# Patient Record
Sex: MALE | Race: WHITE
[De-identification: names, ages, dates, MRNs, and addresses within clinical notes are randomized per-mention and may not be internally consistent; named-entity substitution may affect disease eponyms.]

---

## 2018-12-27 ENCOUNTER — HOSPITAL ENCOUNTER (INPATIENT)
Dept: HOSPITAL 92 - SURG A | Age: 73
LOS: 2 days | Discharge: HOME | DRG: 460 | End: 2018-12-29
Attending: NEUROLOGICAL SURGERY | Admitting: NEUROLOGICAL SURGERY
Payer: OTHER GOVERNMENT

## 2018-12-27 ENCOUNTER — HOSPITAL ENCOUNTER (OUTPATIENT)
Dept: HOSPITAL 92 - LABBT | Age: 73
Discharge: HOME | End: 2018-12-27
Attending: NEUROLOGICAL SURGERY
Payer: OTHER GOVERNMENT

## 2018-12-27 VITALS — BODY MASS INDEX: 22.2 KG/M2

## 2018-12-27 DIAGNOSIS — I10: ICD-10-CM

## 2018-12-27 DIAGNOSIS — M48.062: ICD-10-CM

## 2018-12-27 DIAGNOSIS — N40.0: ICD-10-CM

## 2018-12-27 DIAGNOSIS — Z01.818: Primary | ICD-10-CM

## 2018-12-27 DIAGNOSIS — E78.5: ICD-10-CM

## 2018-12-27 DIAGNOSIS — M51.36: ICD-10-CM

## 2018-12-27 DIAGNOSIS — Z88.1: ICD-10-CM

## 2018-12-27 DIAGNOSIS — Z88.5: ICD-10-CM

## 2018-12-27 DIAGNOSIS — M47.816: Primary | ICD-10-CM

## 2018-12-27 DIAGNOSIS — J44.9: ICD-10-CM

## 2018-12-27 DIAGNOSIS — I73.9: ICD-10-CM

## 2018-12-27 DIAGNOSIS — Z88.8: ICD-10-CM

## 2018-12-27 DIAGNOSIS — M48.061: ICD-10-CM

## 2018-12-27 LAB
ANION GAP SERPL CALC-SCNC: 15 MMOL/L (ref 10–20)
BUN SERPL-MCNC: 14 MG/DL (ref 8.4–25.7)
CALCIUM SERPL-MCNC: 10.8 MG/DL (ref 7.8–10.44)
CHLORIDE SERPL-SCNC: 102 MMOL/L (ref 98–107)
CO2 SERPL-SCNC: 27 MMOL/L (ref 23–31)
CREAT CL PREDICTED SERPL C-G-VRATE: 0 ML/MIN (ref 70–130)
GLUCOSE SERPL-MCNC: 112 MG/DL (ref 83–110)
HGB BLD-MCNC: 15.3 G/DL (ref 14–18)
MCH RBC QN AUTO: 31.7 PG (ref 27–31)
MCV RBC AUTO: 93.4 FL (ref 78–98)
PLATELET # BLD AUTO: 301 THOU/UL (ref 130–400)
POTASSIUM SERPL-SCNC: 4.8 MMOL/L (ref 3.5–5.1)
RBC # BLD AUTO: 4.83 MILL/UL (ref 4.7–6.1)
SODIUM SERPL-SCNC: 139 MMOL/L (ref 136–145)
WBC # BLD AUTO: 9.9 THOU/UL (ref 4.8–10.8)

## 2018-12-27 PROCEDURE — C1713 ANCHOR/SCREW BN/BN,TIS/BN: HCPCS

## 2018-12-27 PROCEDURE — 76001: CPT

## 2018-12-27 PROCEDURE — 93010 ELECTROCARDIOGRAM REPORT: CPT

## 2018-12-27 PROCEDURE — C1768 GRAFT, VASCULAR: HCPCS

## 2018-12-27 PROCEDURE — 80048 BASIC METABOLIC PNL TOTAL CA: CPT

## 2018-12-27 PROCEDURE — 85027 COMPLETE CBC AUTOMATED: CPT

## 2018-12-27 PROCEDURE — 93005 ELECTROCARDIOGRAM TRACING: CPT

## 2018-12-27 PROCEDURE — 94640 AIRWAY INHALATION TREATMENT: CPT

## 2018-12-28 PROCEDURE — 0SG0071 FUSION OF LUMBAR VERTEBRAL JOINT WITH AUTOLOGOUS TISSUE SUBSTITUTE, POSTERIOR APPROACH, POSTERIOR COLUMN, OPEN APPROACH: ICD-10-PCS | Performed by: NEUROLOGICAL SURGERY

## 2018-12-28 PROCEDURE — 0SG3071 FUSION OF LUMBOSACRAL JOINT WITH AUTOLOGOUS TISSUE SUBSTITUTE, POSTERIOR APPROACH, POSTERIOR COLUMN, OPEN APPROACH: ICD-10-PCS | Performed by: NEUROLOGICAL SURGERY

## 2018-12-28 RX ADMIN — CEFAZOLIN SODIUM SCH MLS: 2 SOLUTION INTRAVENOUS at 17:12

## 2018-12-28 RX ADMIN — IPRATROPIUM BROMIDE SCH ML: 0.5 SOLUTION RESPIRATORY (INHALATION) at 19:19

## 2018-12-28 RX ADMIN — HYDROCODONE BITARTRATE AND ACETAMINOPHEN PRN TAB: 10; 325 TABLET ORAL at 14:07

## 2018-12-28 RX ADMIN — CEFAZOLIN SODIUM SCH MLS: 2 SOLUTION INTRAVENOUS at 23:58

## 2018-12-28 RX ADMIN — HYDROCODONE BITARTRATE AND ACETAMINOPHEN PRN TAB: 10; 325 TABLET ORAL at 21:42

## 2018-12-28 NOTE — OP
DATE OF PROCEDURE:  12/28/2018



ASSISTANT:  Hiren.



PROCEDURE PERFORMED:  L4 through S1 decompressive laminectomy, posterolateral

arthrodesis, demineralized bone matrix, local morselized autograft, pedicle screw

instrumentation, L4-S1. 



DESCRIPTION OF PROCEDURE:  The patient was brought to the operating room and

intubated.  He was rolled in a prone position on gel-filled chest rolls.  An

incision was made exposing L4 through S1, and the level was confirmed by x-ray.  We

performed complete L5 and inferior L4 laminectomies and completely decompressed the

L4-L5 and L5-S1 interspaces.  Next, pedicle screws were placed at L4, L5, and S1

bilaterally using lateral fluoroscopic guidance and the position was confirmed by

x-ray.  The carlos was secured between the screws connected by nuts, which were final

tightened.  The wound was then extensively irrigated and maximum hemostasis was

secured.  Combination of demineralized bone matrix and local morselized autograft

was laid over the lamina and posterolateral surfaces for the purpose of arthrodesis.

 Vancomycin powder was applied and the wound was closed in anatomic layers. 







Job ID:  [id number]

## 2018-12-28 NOTE — PDOC.PN
- Subjective


Encounter Start Date: 12/28/18


Encounter Start Time: 15:00


Subjective: no c/o chest pain, sob or palpitations


-: is eating his lunch now





- Objective


MAR Reviewed: Yes


Vital Signs & Weight: 


 Weight











Weight                         155 lb

















Phys Exam





- Physical Examination


HEENT: PERRLA, moist MMs


Neck: no JVD, supple


Respiratory: no wheezing, no rales


Cardiovascular: RRR, no significant murmur


Gastrointestinal: soft, non-tender, positive bowel sounds


Musculoskeletal: no edema, pulses present


Neurological: non-focal, moves all 4 limbs


Psychiatric: normal affect, A&O x 3





Dx/Plan


(1) HTN (hypertension)


Code(s): I10 - ESSENTIAL (PRIMARY) HYPERTENSION   Status: Chronic   


Qualifiers: 


   Hypertension type: essential hypertension   Qualified Code(s): I10 - 

Essential (primary) hypertension   





(2) S/P laminectomy


Code(s): Z98.890 - OTHER SPECIFIED POSTPROCEDURAL STATES   Status: Acute   

Comment: L4-S1, 12/28/2018   





(3) Dyslipidemia


Code(s): E78.5 - HYPERLIPIDEMIA, UNSPECIFIED   Status: Chronic   





(4) BPH (benign prostatic hyperplasia)


Code(s): N40.0 - BENIGN PROSTATIC HYPERPLASIA WITHOUT LOWER URINRY TRACT SYMP   

Status: Chronic   


Qualifiers: 


   Lower urinary tract symptom presence: symptoms absent   Qualified Code(s): 

N40.0 - Benign prostatic hyperplasia without lower urinary tract symptoms   





(5) PVD (peripheral vascular disease)


Code(s): I73.9 - PERIPHERAL VASCULAR DISEASE, UNSPECIFIED   Status: Chronic   

Comment: fem-fem bypass, initial surgery in 2005 then redo in 2006   





(6) COPD (chronic obstructive pulmonary disease)


Status: Chronic   


Qualifiers: 


   COPD type: chronic bronchitis   Chronic bronchitis type: unspecified   

Qualified Code(s): J42 - Unspecified chronic bronchitis   





- Plan


pt is post op laminectomy, doing well


-: continue coreg, cozaar, norvasc, zocor


-: flomax, sertraline, inhalers as before


-: last stress test in 2003 was normal


-: incentive spirometry, PT to mobilize per nsx advice





* .








Review of Systems





- Medications/Allergies


Allergies/Adverse Reactions: 


 Allergies











Allergy/AdvReac Type Severity Reaction Status Date / Time


 


bacitracin Allergy   Verified 12/27/18 14:34





[From Neosporin     





([id number])]     


 


hydrochlorothiazide Allergy   Verified 12/27/18 14:34


 


lisinopril Allergy   Verified 12/27/18 14:34


 


morphine Allergy   Verified 12/27/18 14:34


 


neomycin Allergy   Verified 12/27/18 14:34





[From Neosporin     





([id number])]     


 


nicotine Allergy   Verified 12/27/18 14:34


 


polymyxin B Allergy   Verified 12/27/18 14:34





[From Neosporin     





([id number])]     











Medications: 


 Current Medications





Hydrocodone Bitart/Acetaminophen (Norco 10/325)  1 tab PO Q4H PRN


   PRN Reason: PAIN (1-3)


Hydrocodone Bitart/Acetaminophen (Norco 10/325)  2 tab PO Q4H PRN


   PRN Reason: PAIN (4-6)


   Last Admin: 12/28/18 14:07 Dose:  2 tab


Al Hydroxide/Mg Hydroxide (Maalox)  30 ml PO Q4H PRN


   PRN Reason: Heartburn  or Indigestion


Bisacodyl (Dulcolax)  10 mg TX Q12H PRN


   PRN Reason: Constipation


Diphenhydramine HCl (Benadryl)  25 mg PO Q6H PRN


   PRN Reason: Itching


Diphenhydramine HCl (Benadryl)  25 mg IVP Q6H PRN


   PRN Reason: Itching


Cefazolin Sodium/Dextrose 2 gm (/ Device)  50 mls @ 100 mls/hr IVPB Q8H [facility]


   Stop: 12/29/18 00:29


Sodium Chloride (Normal Saline 0.9%)  1,000 mls @ 75 mls/hr IV .[id number] DENITA


Magnesium Hydroxide (Milk Of Magnesium)  30 ml PO Q12H PRN


   PRN Reason: Constipation


Morphine Sulfate (Morphine)  2 mg IV Q1H PRN


   PRN Reason:  MODERATE BREAKTHROUGH PAIN


Morphine Sulfate (Morphine)  4 mg SLOW IVP Q1H PRN


   PRN Reason: SEVERE BREAKTHROUGH PAIN


Ondansetron HCl (Zofran)  4 mg IM Q24H PRN


   PRN Reason: Nausea/Vomiting


Promethazine HCl (Phenergan)  12.5 mg IM Q4H PRN


   PRN Reason: Nausea/Vomiting


Promethazine HCl (Phenergan)  12.5 mg PO Q4H PRN


   PRN Reason: Nausea/Vomiting


Promethazine HCl (Phenergan Suppository)  12.5 mg TX Q4H PRN


   PRN Reason: Nausea/Vomiting


Sodium Chloride (Flush - Normal Saline)  10 ml IVF Q12HR DENITA


Sodium Chloride (Flush - Normal Saline)  10 ml IVF PRN PRN


   PRN Reason: Saline Flush


Tizanidine HCl (Zanaflex)  4 mg PO Q6H PRN


   PRN Reason: MUSCLE SPASM


Tramadol HCl (Ultram)  50 mg PO Q6H PRN


   PRN Reason: PAIN (1-3)


Tramadol HCl (Ultram)  100 mg PO Q6H PRN


   PRN Reason: PAIN (4-6)

## 2018-12-29 VITALS — DIASTOLIC BLOOD PRESSURE: 71 MMHG | SYSTOLIC BLOOD PRESSURE: 174 MMHG | TEMPERATURE: 99.7 F

## 2018-12-29 RX ADMIN — IPRATROPIUM BROMIDE SCH: 0.5 SOLUTION RESPIRATORY (INHALATION) at 03:47

## 2018-12-29 RX ADMIN — IPRATROPIUM BROMIDE SCH ML: 0.5 SOLUTION RESPIRATORY (INHALATION) at 07:02

## 2018-12-30 NOTE — DIS
DATE OF ADMISSION:  12/28/2018



DATE OF DISCHARGE:  12/29/2018



HOSPITAL COURSE:  The patient is a 73-year-old  male, recently seen in our

office for progressive low back and bilateral leg pain.  The patient underwent L4-S1

decompression and fusion.  Following that, he was transitioned to the [facility]

where his pain was well controlled with p.o. medications, he was tolerating a

regular diet.  The patient initially did have some urinary retention and was treated

with p.o. dose of Flomax as well as I and O straight cath x1.  Following this, the

patient had improvement and is now voiding on his own without any difficulty.  He

was up ambulating as well throughout the department without difficulty. 



I am visiting the patient this morning at the bedside.  He is awake, alert, in no

acute distress.  His incision is dry.  He has free active range of motion of all

extremities.  No focal motor weakness. 



We will plan to dismiss the patient to home.  I discussed home care precautions and

follow up with the patient in 2 weeks.  He has been provided with scripts for

hydrocodone, Zanaflex, and Keflex, and instructions for home. 







Job ID:  [id number]

## 2019-01-10 ENCOUNTER — HOSPITAL ENCOUNTER (OUTPATIENT)
Dept: HOSPITAL 92 - TBSIIMAG | Age: 74
Discharge: HOME | End: 2019-01-10
Attending: NEUROLOGICAL SURGERY
Payer: OTHER GOVERNMENT

## 2019-01-10 DIAGNOSIS — M54.9: Primary | ICD-10-CM

## 2019-01-10 DIAGNOSIS — Z98.1: ICD-10-CM

## 2019-01-10 PROCEDURE — 72100 X-RAY EXAM L-S SPINE 2/3 VWS: CPT

## 2019-01-10 NOTE — RAD
LUMBAR SPINE RADIOGRAPH:

 

INDICATION: 

Followup surgery.

 

COMPARISON: 

None.

 

FINDINGS: 

There is posterolateral instrumentation spanning L4 through S1.  There are laminectomy changes at L4 
and L5.  There are interconnecting rods.  There is extensive vascular calcification involving the abd
ominopelvic vasculature.  There is slight levoscoliosis at L3-4.  There is multilevel degenerative di
sk disease.  Spinal alignment on the lateral projection appears within normal limits.

 

IMPRESSION: 

Posterolateral spinal fusion spanning L4 through S1.

 

POS: ANNE